# Patient Record
Sex: FEMALE | Race: WHITE
[De-identification: names, ages, dates, MRNs, and addresses within clinical notes are randomized per-mention and may not be internally consistent; named-entity substitution may affect disease eponyms.]

---
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
## 2019-09-18 ENCOUNTER — HOSPITAL ENCOUNTER (EMERGENCY)
Dept: HOSPITAL 84 - D.ER | Age: 19
Discharge: HOME | End: 2019-09-18
Payer: MEDICAID

## 2019-09-18 VITALS — DIASTOLIC BLOOD PRESSURE: 54 MMHG | SYSTOLIC BLOOD PRESSURE: 105 MMHG

## 2019-09-18 VITALS — BODY MASS INDEX: 24.64 KG/M2 | HEIGHT: 66 IN | WEIGHT: 153.32 LBS

## 2019-09-18 DIAGNOSIS — J22: Primary | ICD-10-CM

## 2019-09-18 LAB
ANION GAP SERPL CALC-SCNC: 15.8 MMOL/L (ref 8–16)
BUN SERPL-MCNC: 8 MG/DL (ref 7–18)
CALCIUM SERPL-MCNC: 8.7 MG/DL (ref 8.5–10.1)
CHLORIDE SERPL-SCNC: 104 MMOL/L (ref 98–107)
CO2 SERPL-SCNC: 24.6 MMOL/L (ref 21–32)
CREAT SERPL-MCNC: 0.9 MG/DL (ref 0.6–1.3)
ERYTHROCYTE [DISTWIDTH] IN BLOOD BY AUTOMATED COUNT: 11.8 % (ref 11.5–14.5)
GLUCOSE SERPL-MCNC: 105 MG/DL (ref 74–106)
HCT VFR BLD CALC: 35.4 % (ref 36–48)
HGB BLD-MCNC: 12.3 G/DL (ref 12–16)
LYMPHOCYTES NFR BLD AUTO: 7.1 % (ref 15–50)
MCH RBC QN AUTO: 30.1 PG (ref 26–34)
MCHC RBC AUTO-ENTMCNC: 34.7 G/DL (ref 31–37)
MCV RBC: 86.6 FL (ref 80–100)
NEUTROPHILS NFR BLD AUTO: 88.7 % (ref 40–80)
OSMOLALITY SERPL CALC.SUM OF ELEC: 278 MOSM/KG (ref 275–300)
PLATELET # BLD: 315 10X3/UL (ref 130–400)
PMV BLD AUTO: 9 FL (ref 7.4–10.4)
POTASSIUM SERPL-SCNC: 3.4 MMOL/L (ref 3.5–5.1)
RBC # BLD AUTO: 4.09 10X6/UL (ref 4–5.4)
SODIUM SERPL-SCNC: 141 MMOL/L (ref 136–145)
WBC # BLD AUTO: 16.3 10X3/UL (ref 4.8–10.8)

## 2019-09-22 ENCOUNTER — HOSPITAL ENCOUNTER (INPATIENT)
Dept: HOSPITAL 84 - D.ER | Age: 19
LOS: 9 days | Discharge: HOME | DRG: 177 | End: 2019-10-01
Attending: FAMILY MEDICINE | Admitting: FAMILY MEDICINE
Payer: MEDICAID

## 2019-09-22 VITALS
BODY MASS INDEX: 24.32 KG/M2 | BODY MASS INDEX: 24.32 KG/M2 | BODY MASS INDEX: 24.32 KG/M2 | HEIGHT: 66 IN | HEIGHT: 66 IN | WEIGHT: 151.32 LBS | WEIGHT: 151.32 LBS | BODY MASS INDEX: 24.32 KG/M2

## 2019-09-22 VITALS — DIASTOLIC BLOOD PRESSURE: 76 MMHG | SYSTOLIC BLOOD PRESSURE: 125 MMHG

## 2019-09-22 VITALS — DIASTOLIC BLOOD PRESSURE: 87 MMHG | SYSTOLIC BLOOD PRESSURE: 138 MMHG

## 2019-09-22 VITALS — SYSTOLIC BLOOD PRESSURE: 125 MMHG | DIASTOLIC BLOOD PRESSURE: 76 MMHG

## 2019-09-22 VITALS — DIASTOLIC BLOOD PRESSURE: 79 MMHG | SYSTOLIC BLOOD PRESSURE: 129 MMHG

## 2019-09-22 DIAGNOSIS — J15.212: ICD-10-CM

## 2019-09-22 DIAGNOSIS — J15.6: Primary | ICD-10-CM

## 2019-09-22 DIAGNOSIS — J96.01: ICD-10-CM

## 2019-09-22 DIAGNOSIS — F12.90: ICD-10-CM

## 2019-09-22 DIAGNOSIS — I48.0: ICD-10-CM

## 2019-09-22 LAB
ALBUMIN SERPL-MCNC: 2.7 G/DL (ref 3.4–5)
ALP SERPL-CCNC: 112 U/L (ref 46–116)
ALT SERPL-CCNC: 32 U/L (ref 10–68)
AMPHETAMINES UR QL SCN: NEGATIVE QUAL
ANION GAP SERPL CALC-SCNC: 13 MMOL/L (ref 8–16)
APPEARANCE UR: CLEAR
BARBITURATES UR QL SCN: NEGATIVE QUAL
BASOPHILS NFR BLD AUTO: 0.1 % (ref 0–2)
BENZODIAZ UR QL SCN: NEGATIVE QUAL
BILIRUB SERPL-MCNC: 0.43 MG/DL (ref 0.2–1.3)
BILIRUB SERPL-MCNC: NEGATIVE MG/DL
BUN SERPL-MCNC: 7 MG/DL (ref 7–18)
BZE UR QL SCN: NEGATIVE QUAL
CALCIUM SERPL-MCNC: 8.1 MG/DL (ref 8.5–10.1)
CANNABINOIDS UR QL SCN: POSITIVE QUAL
CHLORIDE SERPL-SCNC: 105 MMOL/L (ref 98–107)
CK MB SERPL-MCNC: 0.2 U/L (ref 0–3.6)
CK SERPL-CCNC: 23 UL (ref 21–215)
CO2 SERPL-SCNC: 25.7 MMOL/L (ref 21–32)
COLOR UR: YELLOW
CREAT SERPL-MCNC: 0.7 MG/DL (ref 0.6–1.3)
EOSINOPHIL NFR BLD: 1.9 % (ref 0–7)
ERYTHROCYTE [DISTWIDTH] IN BLOOD BY AUTOMATED COUNT: 12.3 % (ref 11.5–14.5)
ERYTHROCYTE [SEDIMENTATION RATE] IN BLOOD: 108 MM/HR (ref 0–20)
GLOBULIN SER-MCNC: 3.7 G/L
GLUCOSE SERPL-MCNC: 117 MG/DL (ref 74–106)
GLUCOSE SERPL-MCNC: NEGATIVE MG/DL
HCG UR QL: NEGATIVE
HCT VFR BLD CALC: 38 % (ref 36–48)
HGB BLD-MCNC: 13.3 G/DL (ref 12–16)
IMM GRANULOCYTES NFR BLD: 0.2 % (ref 0–5)
KETONES UR STRIP-MCNC: NEGATIVE MG/DL
LYMPHOCYTES NFR BLD AUTO: 6.8 % (ref 15–50)
MAGNESIUM SERPL-MCNC: 1.8 MG/DL (ref 1.8–2.4)
MCH RBC QN AUTO: 30.4 PG (ref 26–34)
MCHC RBC AUTO-ENTMCNC: 35 G/DL (ref 31–37)
MCV RBC: 86.8 FL (ref 80–100)
MONOCYTES NFR BLD: 1.5 % (ref 2–11)
NEUTROPHILS NFR BLD AUTO: 89.5 % (ref 40–80)
NITRITE UR-MCNC: NEGATIVE MG/ML
OPIATES UR QL SCN: NEGATIVE QUAL
OSMOLALITY SERPL CALC.SUM OF ELEC: 277 MOSM/KG (ref 275–300)
PCP UR QL SCN: NEGATIVE QUAL
PH UR STRIP: 6 [PH] (ref 5–6)
PLATELET # BLD: 360 10X3/UL (ref 130–400)
PMV BLD AUTO: 9.2 FL (ref 7.4–10.4)
POTASSIUM SERPL-SCNC: 3.7 MMOL/L (ref 3.5–5.1)
PROT SERPL-MCNC: 6.4 G/DL (ref 6.4–8.2)
PROT UR-MCNC: NEGATIVE MG/DL
RBC # BLD AUTO: 4.38 10X6/UL (ref 4–5.4)
SODIUM SERPL-SCNC: 140 MMOL/L (ref 136–145)
SP GR UR STRIP: 1.01 (ref 1–1.02)
TROPONIN I SERPL-MCNC: 0 NG/ML (ref 0–0.06)
UROBILINOGEN UR-MCNC: NORMAL MG/DL
WBC # BLD AUTO: 15.5 10X3/UL (ref 4.8–10.8)

## 2019-09-22 NOTE — NUR
ADMISSION ASSESSMENT COMPLETED. PT RESTING IN BED WITH MOTHER AT BEDSIDE.
ALERT/ORIENTED.  IVF INFUSING. NO DISTRESS.

## 2019-09-22 NOTE — NUR
PT AWAKE AND ORIENTED, UP AD ALIRIO, ARRIVED VIA WHEELCHAIR. FRIENDLY, NO
COMPLAINTS/CONCERNS, CL IN REACH, SRX2.

## 2019-09-22 NOTE — NUR
RECEIVED REPORT, WILL ASSUME CARE OF PT, DENIES ANY NEEDS AT THIS TIME, FAMILY
AT BEDSIDE, BED IS LOW, SRX2, CALL LIGHT IN REACH, WILL CONTINUE PLAN OF CARE

## 2019-09-23 VITALS — DIASTOLIC BLOOD PRESSURE: 68 MMHG | SYSTOLIC BLOOD PRESSURE: 112 MMHG

## 2019-09-23 VITALS — DIASTOLIC BLOOD PRESSURE: 78 MMHG | SYSTOLIC BLOOD PRESSURE: 132 MMHG

## 2019-09-23 VITALS — DIASTOLIC BLOOD PRESSURE: 61 MMHG | SYSTOLIC BLOOD PRESSURE: 109 MMHG

## 2019-09-23 VITALS — SYSTOLIC BLOOD PRESSURE: 128 MMHG | DIASTOLIC BLOOD PRESSURE: 46 MMHG

## 2019-09-23 VITALS — DIASTOLIC BLOOD PRESSURE: 71 MMHG | SYSTOLIC BLOOD PRESSURE: 120 MMHG

## 2019-09-23 VITALS — SYSTOLIC BLOOD PRESSURE: 118 MMHG | DIASTOLIC BLOOD PRESSURE: 72 MMHG

## 2019-09-23 VITALS — SYSTOLIC BLOOD PRESSURE: 113 MMHG | DIASTOLIC BLOOD PRESSURE: 57 MMHG

## 2019-09-23 NOTE — NUR
PT CARE ASSUMED. BEDSIDE SHIFT REPORT COMPLETE. PT IN BED RR EVEN AND
UNLABORED. NO S/S OF DISTRESS NOTED AT THIS TIME. NO NEEDS EXPRESSED. CALL
LIGHT IN REACH. WILL CTM.

## 2019-09-23 NOTE — NUR
PT IS ALERT AND ORIENTED X4, LYING IN BED, BOYFRIEND ASLEEP IN CHAIR BESIDE
BED. NO FAMILY PRESENT AT THIS TIME, SPOKE WITH PT ABOUT HER POSITIVE [facility] DRUG
SCREEN AND VAPING. PT STATES IT WAS ABOUT 2-3 WEEKS AGO WHEN SHE VAPED WITH
[facility], BUT THAT SHE DID NOT ENJOY IT AND HAS NOT DONE IT AGAIN. NO COMPLAINTS,
CONCERNS OR QUESTIONS AT THIS TIME, CL IN REACH, SRX2.

## 2019-09-24 VITALS — SYSTOLIC BLOOD PRESSURE: 110 MMHG | DIASTOLIC BLOOD PRESSURE: 65 MMHG

## 2019-09-24 VITALS — DIASTOLIC BLOOD PRESSURE: 66 MMHG | SYSTOLIC BLOOD PRESSURE: 108 MMHG

## 2019-09-24 VITALS — DIASTOLIC BLOOD PRESSURE: 64 MMHG | SYSTOLIC BLOOD PRESSURE: 103 MMHG

## 2019-09-24 VITALS — DIASTOLIC BLOOD PRESSURE: 78 MMHG | SYSTOLIC BLOOD PRESSURE: 130 MMHG

## 2019-09-24 VITALS — SYSTOLIC BLOOD PRESSURE: 126 MMHG | DIASTOLIC BLOOD PRESSURE: 65 MMHG

## 2019-09-24 VITALS — DIASTOLIC BLOOD PRESSURE: 84 MMHG | SYSTOLIC BLOOD PRESSURE: 136 MMHG

## 2019-09-24 VITALS — SYSTOLIC BLOOD PRESSURE: 121 MMHG | DIASTOLIC BLOOD PRESSURE: 74 MMHG

## 2019-09-24 VITALS — SYSTOLIC BLOOD PRESSURE: 132 MMHG | DIASTOLIC BLOOD PRESSURE: 76 MMHG

## 2019-09-24 LAB
ANION GAP SERPL CALC-SCNC: 14.3 MMOL/L (ref 8–16)
BASOPHILS NFR BLD AUTO: 0.1 % (ref 0–2)
BUN SERPL-MCNC: 5 MG/DL (ref 7–18)
CALCIUM SERPL-MCNC: 9 MG/DL (ref 8.5–10.1)
CHLORIDE SERPL-SCNC: 104 MMOL/L (ref 98–107)
CO2 SERPL-SCNC: 23 MMOL/L (ref 21–32)
CREAT SERPL-MCNC: 0.6 MG/DL (ref 0.6–1.3)
EOSINOPHIL NFR BLD: 1.2 % (ref 0–7)
ERYTHROCYTE [DISTWIDTH] IN BLOOD BY AUTOMATED COUNT: 12.6 % (ref 11.5–14.5)
GLUCOSE SERPL-MCNC: 92 MG/DL (ref 74–106)
HCT VFR BLD CALC: 31.4 % (ref 36–48)
HGB BLD-MCNC: 10.9 G/DL (ref 12–16)
IGA SERPL-MCNC: 123 MG/DL (ref 87–352)
IGG SERPL-MCNC: 781 MG/DL (ref 700–1600)
IMM GRANULOCYTES NFR BLD: 0.3 % (ref 0–5)
LYMPHOCYTES NFR BLD AUTO: 7.8 % (ref 15–50)
M PNEUMO IGG SER IA-ACNC: 407 U/ML (ref 0–99)
MCH RBC QN AUTO: 29.6 PG (ref 26–34)
MCHC RBC AUTO-ENTMCNC: 34.7 G/DL (ref 31–37)
MCV RBC: 85.3 FL (ref 80–100)
MONOCYTES NFR BLD: 0.8 % (ref 2–11)
NEUTROPHILS NFR BLD AUTO: 89.8 % (ref 40–80)
OSMOLALITY SERPL CALC.SUM OF ELEC: 272 MOSM/KG (ref 275–300)
PLATELET # BLD: 348 10X3/UL (ref 130–400)
PMV BLD AUTO: 9 FL (ref 7.4–10.4)
POTASSIUM SERPL-SCNC: 3.3 MMOL/L (ref 3.5–5.1)
RBC # BLD AUTO: 3.68 10X6/UL (ref 4–5.4)
SODIUM SERPL-SCNC: 138 MMOL/L (ref 136–145)
WBC # BLD AUTO: 10.6 10X3/UL (ref 4.8–10.8)

## 2019-09-24 NOTE — NUR
RESTING IN BED, NO DISTRESS. IV FLUIDS INFUSING AS ORDERED. PATIENTS FAMILY AT
BEDSIDE. NO DISTRESS.

## 2019-09-24 NOTE — NUR
PATIENT RETURNED TO UNIT VIA BED FROM BRONCHOSCOPY. FAMILY AT BEDSIDE. PATIENT
ALERT/ORIENTED. CALL LIGHT WITHIN REACH. VS MONITORED EVERY 15 MINUTES
STARTING NOW AFTER RETURING FROM PROCEDURE. PATIENT IS ABLE TO RESUME ORAL
COMSUMPTION AT 1630 PER PACU NURSE.

## 2019-09-24 NOTE — NUR
PATIENT [id number] OF NOT BEING ABLE TO BREATHE WELL. JUST COMPLETED NEBULIZER
TREATMENT. PATIENT LYING FLAT AT THIS TIME. INSTRUCTED PATIENT TO SIT STRAIGHT
UP, LUNGS CLEAR TO ALL LOBES WITH THE EXCEPTION TO THE LEFT POSTERIOR LOBE IS
DIMENISHED, NOT MOVING MUCH AIR. PATIENT STATES SHE FEELS BETTER NOW THAT SHE
IS SITTING UP. CALLED RESP THERAPY TO BEDSIDE TO EVALUATE AS WELL. FAMILY AT
BEDSIDE. NO ACUTE DISTRESS AT THIS TIME. IV FLUIDS INFUSING AS ORDERED.

## 2019-09-24 NOTE — EC
PATIENT:BONITA LUCERO                DATE OF SERVICE: 09/22/19
SEX: F                                  MEDICAL RECORD: [id number]
DATE OF BIRTH: [birth year]                        LOCATION:D.M2      D.211
AGE OF PATIENT: 19                             ADMISSION DATE: 09/22/19
 
REFERRING PHYSICIAN:                               
 
INTERPRETING PHYSICIAN: EAN DUNBAR MD             
 
 
 
                             ECHOCARDIOGRAM REPORT
  ECHO CHARGES 4               ECHO COMPLETE                 Date: 09/23/19
 
 
 
CLINICAL DIAGNOSIS: TACHYCARDIA                   
 
                         ECHOCARDIOGRAPHIC MEASUREMENTS
      (adult normal given)
   AC root (d.<3.7cm) 2.5  cm   LV Septum d (<1.2 cm> 0.8  cm
      Valve Excursion 2.0  cm     LV Septum (systole) 1.0  cm
Left Atria (s.<4.0cm> 2.8  cm          LVPW d(<1.2cm) 1.0  cm
        RV (d.<2.3cm) 2.3  cm           LVPW (sytole) 1.4  cm
  LV diastole(<5.6CM) 4.6  cm       MV E-F(>70mm/sec)      cm
           LV systole 2.9  cm           LVOT Diameter 2.0  cm
       MV exc.(>10mm)      cm
Est.ejection fraction (50-75%)     %
 
   DOPPLER:
     LVIT      cm/sec A 60   cm/sec E 94    cm/sec
       LA      cm/sec      RVSP 23.0 mmHg
     LVOT 129  cm/sec   AOP1/2T      m/s
  Asc. Ao 147  cm/sec
     RVOT 64   cm/sec
       RA      cm/sec
       PA 92   cm/sec
 AV Gradient Peak 8.6  mmHg  AV Mean 5.1  mmHg  AV Area 2.7  cm
 MV Gradient Peak 6.8  mmHg  MV Mean 3.2  mmHg  MV Area      cm
   COMMENTS:                                              
 
 
 Cardiac Sonographer: Jaclyn BAILEYICA DOWNS             
      Cardiologist: 1          Dr. Dunbar                
             TAPE# PACS           
                                       Pericardial Effusion N                        
 
 
DATE OF SERVICE:  09/23/2019
 
FINDINGS:
1.  Left ventricular chamber size is within normal limits.  Left ventricular
systolic function is normal at 65%.
2.  Left atrium is within normal limits.  Right atrium and right ventricular
chamber sizes are mildly dilated.
3.  Valvular structures have normal structure and motion.
4.  Doppler interrogation reveals no significant valvular insufficiency or
stenosis and pulmonary systolic pressure is estimated at 23 mmHg.
 
 
 
ECHOCARDIOGRAM REPORT                          [id number]    BONITA LUCERO        
 
 
5.  No evidence of pericardial effusion or left ventricular thrombus.
 
TRANSINT:[id number] Voice Confirmation ID: [id number] DOCUMENT ID: [id number]
                                           
                                           EAN DUNBAR MD             
 
 
 
Electronically Signed by EAN DUNBAR on 09/24/19 at 1338
 
 
 
 
 
 
 
 
 
 
 
 
 
 
 
 
 
 
 
 
 
 
 
 
 
 
 
 
 
 
 
 
 
 
 
 
 
CC:                                                             [id number]
DICTATION DATE: 09/23/19 1649     :     09/24/19 0127      ADM IN  
                                                                              
[id number] [place] [id number]

## 2019-09-24 NOTE — CN
PATIENT NAME:BONITA LUCERO                              MEDICAL RECORD: [id number]
: [birth year]                                              LOCATION:[facility] D.2110
ADMIT DATE: 19                                       ACCOUNT: [id number]
CONSULTING PHYSICIAN:    EAN SADLER MD             
                                               
REFERRING PHYSICIAN:     DANNIE SEALS MD           
 
 
DATE OF CONSULTATION:  2019
 
CARDIOLOGY CONSULTATION
 
DIAGNOSES:
1.  Tachycardia.
2.  Valvular heart disease, mitral regurgitation.
3.  Shortness breath, dyspnea on exertion.
4.  Pneumonia.
5.  Asthma.
6.  Paroxysmal atrial fibrillation.
 
HISTORY OF PRESENT ILLNESS:  This is a patient who has been seen by Dr. Velazquez in
the past with paroxysmal atrial fibrillation, mitral valve prolapse, and mitral
regurgitation now presents with shortness of breath, dyspnea on exertion,
myalgias, fevers and is diagnosed with pneumonia.  She has been treated for
pneumonia.  She is also with a history of asthma, on bronchodilators for the
asthma.  She has had sinus tachycardia up to the 150 range, but no atrial
fibrillation.  There was no atrial flutter.
 
PHYSICAL EXAMINATION:
CONSTITUTIONAL/GENERAL APPEARANCE:  Well nourished, well developed, appears
stated age.
EYES:  Lids and conjunctivae noninjected.  No discharge.  No pallor.
ENT:  Lips within normal limit.  No cyanosis.  No pallor.
NECK:  Carotid arteries, bilateral normal upstroke.  No bruits.  No thrills.  No
jugular venous pressure or distention.
CERVICAL LYMPH NODES:  Nontender.  Nonenlarged.
THYROID:  Not enlarged.  No nodules.
CARDIOVASCULAR:  Precordial exam, nondisplaced.  No heaves or pericardial
thrills.  Rate and rhythm, regular.  Heart sounds, normal S1, normal S2.  No S3,
no gallop, no rub.  Systolic murmur, not heard.  Diastolic murmur, not heard.
RESPIRATORY:  Respiratory effort, unlabored.  Normal curvature.  No thoracic
deformity.  No chest wall tenderness.  Percussion, resonant.  Auscultation,
clear.  No wheezes, no rales, no rhonchi.
ABDOMEN:  Soft, nondistended, nontender.  No abdominal pain, no vomiting and
normal appetite.
MUSCULOSKELETAL:  No joint tenderness, normal gait, normal tone.
SKIN:  Warm and dry.
 
OVERALL IMPRESSION:  Tachycardia, this is a physiologic response at this time to
the pneumonia, bronchodilators, and the shortness of breath, dyspnea on
exertion.  She has had no complex dysrhythmias.  At this time, we will recheck
an echo to reassess her valvular heart disease.  At this time, no other workup
or treatment will be necessary.
 
TRANSINT:[id number] Voice Confirmation ID: [id number] DOCUMENT ID: [id number]
 
 
 
CONSULT REPORT                                 [id number]    BONITA LUCERO JEFFREY MD             
 
 
 
Electronically Signed by EAN SADLER on 19 at 1338
 
 
 
 
 
 
 
 
 
 
 
 
 
 
 
 
 
 
 
 
 
 
 
 
 
 
 
 
 
 
 
 
 
 
 
 
 
 
 
 
 
CC:                                                             [id number]
DICTATION DATE: 19 1203     :     19 1355      ADM IN  
                                                                              
[id number] [place] [address]

## 2019-09-24 NOTE — NUR
RECEIVED REPORT. ASSUMED CARE OF PATIENT. RESTING ON LEFT LATERAL SIDE WITH
EYES CLOSED, EASILY AROUSED. RESP EVEN AND UNLABORED. NO DISTRESS.

## 2019-09-25 VITALS — DIASTOLIC BLOOD PRESSURE: 83 MMHG | SYSTOLIC BLOOD PRESSURE: 128 MMHG

## 2019-09-25 VITALS — SYSTOLIC BLOOD PRESSURE: 116 MMHG | DIASTOLIC BLOOD PRESSURE: 68 MMHG

## 2019-09-25 VITALS — DIASTOLIC BLOOD PRESSURE: 80 MMHG | SYSTOLIC BLOOD PRESSURE: 125 MMHG

## 2019-09-25 VITALS — DIASTOLIC BLOOD PRESSURE: 60 MMHG | SYSTOLIC BLOOD PRESSURE: 123 MMHG

## 2019-09-25 VITALS — SYSTOLIC BLOOD PRESSURE: 117 MMHG | DIASTOLIC BLOOD PRESSURE: 68 MMHG

## 2019-09-25 VITALS — SYSTOLIC BLOOD PRESSURE: 130 MMHG | DIASTOLIC BLOOD PRESSURE: 76 MMHG

## 2019-09-25 LAB
ANION GAP SERPL CALC-SCNC: 13.2 MMOL/L (ref 8–16)
BASOPHILS NFR BLD AUTO: 0.1 % (ref 0–2)
BUN SERPL-MCNC: 7 MG/DL (ref 7–18)
CALCIUM SERPL-MCNC: 8.8 MG/DL (ref 8.5–10.1)
CHLORIDE SERPL-SCNC: 106 MMOL/L (ref 98–107)
CO2 SERPL-SCNC: 24.6 MMOL/L (ref 21–32)
CREAT SERPL-MCNC: 0.6 MG/DL (ref 0.6–1.3)
EOSINOPHIL NFR BLD: 0 % (ref 0–7)
EOSINOPHIL NFR FLD MANUAL: 9 %
ERYTHROCYTE [DISTWIDTH] IN BLOOD BY AUTOMATED COUNT: 12.7 % (ref 11.5–14.5)
GLUCOSE SERPL-MCNC: 135 MG/DL (ref 74–106)
HCT VFR BLD CALC: 30 % (ref 36–48)
HGB BLD-MCNC: 10.5 G/DL (ref 12–16)
IMM GRANULOCYTES NFR BLD: 0.3 % (ref 0–5)
LYMPHOCYTES NFR BLD AUTO: 3.3 % (ref 15–50)
MACROPHAGES NFR FLD MANUAL: 20 %
MCH RBC QN AUTO: 29.7 PG (ref 26–34)
MCHC RBC AUTO-ENTMCNC: 35 G/DL (ref 31–37)
MCV RBC: 85 FL (ref 80–100)
MONOCYTES NFR BLD: 0.5 % (ref 2–11)
NEUTROPHILS NFR BLD AUTO: 95.8 % (ref 40–80)
NEUTROPHILS NFR FLD MANUAL: 64 %
OSMOLALITY SERPL CALC.SUM OF ELEC: 278 MOSM/KG (ref 275–300)
PLATELET # BLD: 310 10X3/UL (ref 130–400)
PMV BLD AUTO: 8.9 FL (ref 7.4–10.4)
POTASSIUM SERPL-SCNC: 3.8 MMOL/L (ref 3.5–5.1)
RBC # BLD AUTO: 3.53 10X6/UL (ref 4–5.4)
SODIUM SERPL-SCNC: 140 MMOL/L (ref 136–145)
WBC # BLD AUTO: 11.6 10X3/UL (ref 4.8–10.8)

## 2019-09-25 PROCEDURE — 0B9J8ZX DRAINAGE OF LEFT LOWER LUNG LOBE, VIA NATURAL OR ARTIFICIAL OPENING ENDOSCOPIC, DIAGNOSTIC: ICD-10-PCS | Performed by: INTERNAL MEDICINE

## 2019-09-25 NOTE — NUR
PATIENT IS STABLE. PATIENT DENIES ANY NEEDS OR PAIN. WILL CONTINUE TO MONITOR.
SR UP X 2 BED IN LOW POSITION AND CALL LIGHT  IN  REACH.

## 2019-09-25 NOTE — NUR
PT CARE ASSUMED. BEDSIDE SHIFT REPORT COMPLETE. PT IN BED RR EVEN AND
UNLABORED. NO S/S OF DISTRESS NOTED AT THIS TIME. PT DENIES NEEDS. MULTIPLE
FRIENDS AT BEDSIDE. CALL LIGHT IN REACH. WILL CPOC.

## 2019-09-25 NOTE — MORECARE
CASE MANAGEMENT DISCHARGE SUMMARY
 
 
PATIENT: BONITA LUCERO                    UNIT: [id number]
ACCOUNT#: [id number]                       ADM DATE: 19
AGE: 19     : [birth year]  SEX: F            ROOM/BED: D[facility]    
AUTHOR: ENEDINADOC                             PHYSICIAN:                               
 
REFERRING PHYSICIAN: DANNIE SEALS MD           
DATE OF SERVICE: 19
Discharge Plan
 
 
Patient Name: BONITA LUCERO
Facility: [facility]:[place]
Encounter #: [id number]
Medical Record #: [id number]
: 2000
Planned Disposition: 
Anticipated Discharge Date: 
 
Discharge Date: 
Expected LOS: 
Initial Reviewer: [id number]
Initial Review Date: 2019
Generated: 19   4:40 pm 
Comments
 
DCP- Discharge Planning
 
Updated by [id number]: Yi Villarreal on 19   2:39 pm CT
Patient Name: BONITA LUCERO                                     
Admission Status: ER   
Accout number: [id number]                              
Admission Date: 2019   
: 2000                                                        
Admission Diagnosis:SHORTNESS OF BREATH   
Attending: ANEL SEALS                                                
Current LOS:  3   
  
Anticipated DC Date:    
Planned Disposition:    
Primary Insurance: [employer] [place] PRIVATE OPTIONS IRAM   
  
  
Discharge Planning Comments: CM MET WITH PATIENT ABOUT DC PLANNING/NEEDS. 
STATES MAY NEED O2 AND NEBS AT DISCHARGE. [facility] SIGNED FOR MODERN CARE IN 
[facility]. CM WILL FOLLOW AND ASSIST.   
  
  
 
  
  
  
  
: iY Villarreal
 DCPIA - Discharge Planning Initial Assessment
 
Updated by [id number]: Yi Villarreal on 19   3:38 pm
*  Is the patient Alert and Oriented?
Yes
*  PCP
ABA
*  Pharmacy
[facility] IN [place]
*  Preadmission Environment
Home with Family
*  ADLs
Independent
*  Other Equipment
NONE
*  Community resources currently utilized
None
*  Can the patient safely return to the preadmission environment?
Yes
*  Has this patient been hospitalized within the prior 30 days at any 
hospital?
No
 
 
 
 
 
Coverage Notice
 
Reviewer: [email or web address] - Yi Villarreal
 
Notice Issued Date-Time: 2019  15:39
Notice Type: Patient Choice Letter
 
Notice Delivered To: Patient
Relationship to Patient: 
Representative Name: 
 
Delivery Method: HAND - Hand Delivered
Isabell Days:
Prior Verbal Notification: 
 
Recipient Understood Notice: Yes
Recipient Signature: Yes
Med Rec Note Co-signed by Attending:
 
Coverage Notice Comment:  
 
 
Last DP export: 19  10:18 a
Patient Name: BONITA LUCERO
Encounter #: [id number]
Page [id number]
 
 
 
 
 
Electronically Signed by HARPAL MCCONNELL on 19 at 1540
 
 
 
 
 
 
**All edits/amendments must be made on the electronic document**
 
DICTATION DATE: 19 154     : MEGAN  19     
RPT#: [id number]                                DC DATE:        
                                               STATUS: ADM IN  
[facility]
 [place] [address]
***END OF REPORT***

## 2019-09-25 NOTE — NUR
PATIENT STABLE AND VSS. PATIENT ORDERED LUNCH TRAY. MOTHER  AT BS. PATIENT
DENIES ANY NEEDS OR PAIN. WILL CONTINUE TO [facility]. SR UP X 2 BED IN LOW
POSITION AND CALL LIGHT IN REACH.

## 2019-09-25 NOTE — NUR
PATIENT RETURNED FROM SURGERY. PATIENT IS STABLE AND VSS. PATIENT IS AWAKE.
DENIES ANY NEEDS OR PAIN. FREQUENT VS SET UP. MOTHER AT BS. PATIENT TO REMAIN
NPO FOR TWO HOURS. SR UP X 2 BED IN LOW POSITION AND CALL LIGHT INR EACH.

## 2019-09-25 NOTE — NUR
PATEINT STABLE AND UNCHANGED. PATIENT DENIES ANY NEEDS OR PAIN. WILL CONTINUE
TO MONITOR.  MOTHER AT BS. SR UP X 2 BED IN LOW POSITION AND CALL LIGHT IN
REACH.

## 2019-09-25 NOTE — NUR
REPORT RECEIVED FROM NIGHT SHIFT AND PATIENT CARE ASSUMED. PATIENT SITTING UP
IN BS CHAIR AWAKE, ALERT AND ORIENTED X 4. PATIENT DENIES ANY NEEDS OR PAIN.
WILL CONTINUE WITH PLAN OF CARE. SR UP X 2 BED IN LOW POSTION AND CALL LIGHT
IN REACH. FAMILY AT BS.

## 2019-09-25 NOTE — MORECARE
CASE MANAGEMENT DISCHARGE SUMMARY
 
 
PATIENT: BONITA LUCERO                    UNIT: [id number]
ACCOUNT#: [id number]                       ADM DATE: 19
AGE: 19     : [birth year]  SEX: F            ROOM/BED: D.2110    
AUTHOR: HARPAL MCCONNELL                             PHYSICIAN:                               
 
REFERRING PHYSICIAN: DANNIE SEALS MD           
DATE OF SERVICE: 19
Discharge Plan
 
 
Patient Name: BONITA LUCERO
[facility]
Encounter #: [id number]
Medical Record #: [id number]
: 2000
Planned Disposition: 
Anticipated Discharge Date: 
 
Discharge Date: 
Expected LOS: 
Initial Reviewer: [email or web address]
Initial Review Date: 2019
Generated: 19  12:18 pm 
  
 
 
 
 
 
 
 
Patient Name: BONITA LUCERO
 
Encounter #: [id number]
Page [id number]
 
 
 
 
 
Electronically Signed by HARPAL MCCONNELL on 19 at 1118
 
 
 
 
 
 
**All edits/amendments must be made on the electronic document**
 
DICTATION DATE: 19 1118     : MEGAN  19 1118     
RPT#: [id number]                                DC DATE:        
                                               STATUS: ADM IN  
[facility]
[address] [place] [address]
***END OF REPORT***

## 2019-09-26 VITALS — SYSTOLIC BLOOD PRESSURE: 125 MMHG | DIASTOLIC BLOOD PRESSURE: 67 MMHG

## 2019-09-26 VITALS — SYSTOLIC BLOOD PRESSURE: 135 MMHG | DIASTOLIC BLOOD PRESSURE: 80 MMHG

## 2019-09-26 VITALS — DIASTOLIC BLOOD PRESSURE: 87 MMHG | SYSTOLIC BLOOD PRESSURE: 132 MMHG

## 2019-09-26 VITALS — DIASTOLIC BLOOD PRESSURE: 82 MMHG | SYSTOLIC BLOOD PRESSURE: 124 MMHG

## 2019-09-26 VITALS — SYSTOLIC BLOOD PRESSURE: 118 MMHG | DIASTOLIC BLOOD PRESSURE: 64 MMHG

## 2019-09-26 VITALS — SYSTOLIC BLOOD PRESSURE: 130 MMHG | DIASTOLIC BLOOD PRESSURE: 82 MMHG

## 2019-09-26 LAB
ANION GAP SERPL CALC-SCNC: 12.7 MMOL/L (ref 8–16)
BASOPHILS NFR BLD AUTO: 0.1 % (ref 0–2)
BUN SERPL-MCNC: 7 MG/DL (ref 7–18)
CALCIUM SERPL-MCNC: 8.9 MG/DL (ref 8.5–10.1)
CHLORIDE SERPL-SCNC: 107 MMOL/L (ref 98–107)
CO2 SERPL-SCNC: 25.1 MMOL/L (ref 21–32)
CREAT SERPL-MCNC: 0.6 MG/DL (ref 0.6–1.3)
EOSINOPHIL NFR BLD: 0 % (ref 0–7)
ERYTHROCYTE [DISTWIDTH] IN BLOOD BY AUTOMATED COUNT: 12.8 % (ref 11.5–14.5)
GLUCOSE SERPL-MCNC: 181 MG/DL (ref 74–106)
HCT VFR BLD CALC: 29.6 % (ref 36–48)
HGB BLD-MCNC: 10.3 G/DL (ref 12–16)
IGE SERPL-ACNC: 92 IU/ML (ref 6–495)
IMM GRANULOCYTES NFR BLD: 0.3 % (ref 0–5)
LYMPHOCYTES NFR BLD AUTO: 4.1 % (ref 15–50)
MCH RBC QN AUTO: 29.6 PG (ref 26–34)
MCHC RBC AUTO-ENTMCNC: 34.8 G/DL (ref 31–37)
MCV RBC: 85.1 FL (ref 80–100)
MONOCYTES NFR BLD: 1.6 % (ref 2–11)
NEUTROPHILS NFR BLD AUTO: 93.9 % (ref 40–80)
OSMOLALITY SERPL CALC.SUM OF ELEC: 285 MOSM/KG (ref 275–300)
PLATELET # BLD: 365 10X3/UL (ref 130–400)
PMV BLD AUTO: 8.9 FL (ref 7.4–10.4)
POTASSIUM SERPL-SCNC: 2.8 MMOL/L (ref 3.5–5.1)
RBC # BLD AUTO: 3.48 10X6/UL (ref 4–5.4)
SODIUM SERPL-SCNC: 142 MMOL/L (ref 136–145)
WBC # BLD AUTO: 17.6 10X3/UL (ref 4.8–10.8)

## 2019-09-26 NOTE — NUR
PT CARE ASSUMED. BEDSIDE SHIFT REPORT COMPLETE. RR EVEN AND UNLABORED ON 4L
HF. NO S/S OF DISTRESS NOTED AT THIS TIME. PROVIDED TYLENOL FOR C/O OF
HEADACHE. NO FUTHER NEEDS EXPRESSED. FAMILY AT BEDSIDE. CALL LIGHT IN REACH.
WILL CPOC.

## 2019-09-26 NOTE — NUR
REPORT RECEIVED FROM NIGHT SHIFT AND PATIENT CARE ASSUMED. PATIENT LAYING IN
BED ON BACK WITH EYES CLOSED AND BREATHING EVENLY. FRIEND KELLYBRIANAMANNY IN BS CHAIR.
WILL CONTINUE WITH PLAN OF CARE. SR UP X 2 BED IN LOW POSITION AND CALL LIGHT
IN REACH.

## 2019-09-26 NOTE — NUR
PATIENT UP IN HALLWAY. ATTEMPTED TO AMUBULATE. PATIENT BECAME DYSPENIC AND HR
148 WITH 02 AT 5L NC. PATIENT BACK TO ROOM IN BED. FAMILY AT BS. WILL CONTINUE
TO MONITOR. SR UPX 2 BED IN LOW POSITION AND CALL LIGHT IN REACH.

## 2019-09-26 NOTE — NUR
PATIENT SITTING UP IN BED EATING SUPPER. FAMILY AT BS. PATIENT DENIES ANY
NEEDS OR PAIN. WILL CONTINUE TO MONITOR. SR UP X 2 BED IN LOW POSITION AND
CALL LIGHT IN REACH.

## 2019-09-27 VITALS — DIASTOLIC BLOOD PRESSURE: 87 MMHG | SYSTOLIC BLOOD PRESSURE: 139 MMHG

## 2019-09-27 VITALS — SYSTOLIC BLOOD PRESSURE: 121 MMHG | DIASTOLIC BLOOD PRESSURE: 76 MMHG

## 2019-09-27 VITALS — SYSTOLIC BLOOD PRESSURE: 124 MMHG | DIASTOLIC BLOOD PRESSURE: 78 MMHG

## 2019-09-27 VITALS — DIASTOLIC BLOOD PRESSURE: 98 MMHG | SYSTOLIC BLOOD PRESSURE: 121 MMHG

## 2019-09-27 VITALS — SYSTOLIC BLOOD PRESSURE: 146 MMHG | DIASTOLIC BLOOD PRESSURE: 80 MMHG

## 2019-09-27 VITALS — DIASTOLIC BLOOD PRESSURE: 80 MMHG | SYSTOLIC BLOOD PRESSURE: 131 MMHG

## 2019-09-27 LAB
ANION GAP SERPL CALC-SCNC: 11.9 MMOL/L (ref 8–16)
BASOPHILS NFR BLD AUTO: 0 % (ref 0–2)
BUN SERPL-MCNC: 5 MG/DL (ref 7–18)
CALCIUM SERPL-MCNC: 8.8 MG/DL (ref 8.5–10.1)
CHLORIDE SERPL-SCNC: 105 MMOL/L (ref 98–107)
CK SERPL-CCNC: 11 UL (ref 21–215)
CO2 SERPL-SCNC: 28.6 MMOL/L (ref 21–32)
CREAT SERPL-MCNC: 0.5 MG/DL (ref 0.6–1.3)
CRP SERPL-MCNC: 13.7 MG/DL (ref 0–0.9)
EOSINOPHIL NFR BLD: 0 % (ref 0–7)
ERYTHROCYTE [DISTWIDTH] IN BLOOD BY AUTOMATED COUNT: 12.8 % (ref 11.5–14.5)
ERYTHROCYTE [SEDIMENTATION RATE] IN BLOOD: 86 MM/HR (ref 0–20)
GLUCOSE SERPL-MCNC: 164 MG/DL (ref 74–106)
HCT VFR BLD CALC: 30.2 % (ref 36–48)
HGB BLD-MCNC: 10 G/DL (ref 12–16)
IGG SERPL-MCNC: 735 MG/DL (ref 700–1600)
IGG1 SER-MCNC: 313 MG/DL (ref 248–810)
IGG2 SER-MCNC: 309 MG/DL (ref 130–555)
IGG3 SER-MCNC: 40 MG/DL (ref 15–102)
IGG4 SER-MCNC: 15 MG/DL (ref 2–96)
IMM GRANULOCYTES NFR BLD: 0.3 % (ref 0–5)
LYMPHOCYTES NFR BLD AUTO: 3.9 % (ref 15–50)
MCH RBC QN AUTO: 29 PG (ref 26–34)
MCHC RBC AUTO-ENTMCNC: 33.1 G/DL (ref 31–37)
MCV RBC: 87.5 FL (ref 80–100)
MONOCYTES NFR BLD: 1.6 % (ref 2–11)
NEUTROPHILS NFR BLD AUTO: 94.2 % (ref 40–80)
OSMOLALITY SERPL CALC.SUM OF ELEC: 283 MOSM/KG (ref 275–300)
PLATELET # BLD: 433 10X3/UL (ref 130–400)
PMV BLD AUTO: 9 FL (ref 7.4–10.4)
POTASSIUM SERPL-SCNC: 3.5 MMOL/L (ref 3.5–5.1)
RBC # BLD AUTO: 3.45 10X6/UL (ref 4–5.4)
SODIUM SERPL-SCNC: 142 MMOL/L (ref 136–145)
SPECIMEN PREPARATION: (no result)
WBC # BLD AUTO: 14.6 10X3/UL (ref 4.8–10.8)

## 2019-09-27 NOTE — NUR
PT ALERT AND ORIENTED WHEN ENTERING THE ROOM. HOB ELEVATED TO A 40 DEGREE
ANGLE. PATIENT WEARING HF NC AT 4L. HAS RIGHT AC AT A KVO RATE. PATIENT C/O
HEADACHE. REQUESTS TYLENOL WHEN AVAILABLE. DENIES FURTHER NEEDS AT THIS TIME.
DISCUSSED WITH PATIENT SMOKING CESSATION. PATIENT VERBALIZES UNDERSTANDING.
HAS CALL LIGHT IN REACH. CPOC.

## 2019-09-27 NOTE — NUR
DR HILL IN ROOM. NEW ORDERS RECEIVED. PATIENT IS STABLE AND VSS. PATIENT
DENIES ANY NEEDS OR PAIN. WILL CONTINUE TO MONITOR. SR UP X 2 BED IN LOW
POSITION AND CALL LIGHT IN REACH.

## 2019-09-27 NOTE — NUR
REPORT RECEIVED FROM NIGHT SHIFT AND PATIENT CARE ASSUMED. PATIENT LAYING IN
BED AWAKE, ALERT AND ORIENTED X 4. PATIENT IS STABLE AND VSS. PATIENT DENIES
ANY NEEDS OR PAIN. WILL CONTINUE WITH PLAN OF CARE. SR UPX 2 BED IN LOW
POSITION AND CALL LIGHT IN REACH.

## 2019-09-28 VITALS — DIASTOLIC BLOOD PRESSURE: 72 MMHG | SYSTOLIC BLOOD PRESSURE: 132 MMHG

## 2019-09-28 VITALS — DIASTOLIC BLOOD PRESSURE: 93 MMHG | SYSTOLIC BLOOD PRESSURE: 145 MMHG

## 2019-09-28 VITALS — DIASTOLIC BLOOD PRESSURE: 90 MMHG | SYSTOLIC BLOOD PRESSURE: 145 MMHG

## 2019-09-28 VITALS — SYSTOLIC BLOOD PRESSURE: 139 MMHG | DIASTOLIC BLOOD PRESSURE: 90 MMHG

## 2019-09-28 VITALS — DIASTOLIC BLOOD PRESSURE: 69 MMHG | SYSTOLIC BLOOD PRESSURE: 119 MMHG

## 2019-09-28 VITALS — DIASTOLIC BLOOD PRESSURE: 78 MMHG | SYSTOLIC BLOOD PRESSURE: 134 MMHG

## 2019-09-28 LAB
ANION GAP SERPL CALC-SCNC: 12.2 MMOL/L (ref 8–16)
BASOPHILS NFR BLD AUTO: 0 % (ref 0–2)
BUN SERPL-MCNC: 8 MG/DL (ref 7–18)
CALCIUM SERPL-MCNC: 8.5 MG/DL (ref 8.5–10.1)
CHLORIDE SERPL-SCNC: 104 MMOL/L (ref 98–107)
CO2 SERPL-SCNC: 30.6 MMOL/L (ref 21–32)
CREAT SERPL-MCNC: 0.5 MG/DL (ref 0.6–1.3)
EOSINOPHIL NFR BLD: 0 % (ref 0–7)
ERYTHROCYTE [DISTWIDTH] IN BLOOD BY AUTOMATED COUNT: 12.9 % (ref 11.5–14.5)
FERRITIN SERPL-MCNC: 180 NG/ML (ref 3–244)
GLUCOSE SERPL-MCNC: 161 MG/DL (ref 74–106)
HCT VFR BLD CALC: 31.1 % (ref 36–48)
HGB BLD-MCNC: 10.3 G/DL (ref 12–16)
IMM GRANULOCYTES NFR BLD: 0.7 % (ref 0–5)
IRON SERPL-MCNC: 77 UG/DL (ref 35–150)
LYMPHOCYTES NFR BLD AUTO: 5 % (ref 15–50)
MCH RBC QN AUTO: 29.3 PG (ref 26–34)
MCHC RBC AUTO-ENTMCNC: 33.1 G/DL (ref 31–37)
MCV RBC: 88.4 FL (ref 80–100)
MONOCYTES NFR BLD: 2.9 % (ref 2–11)
NEUTROPHILS NFR BLD AUTO: 91.4 % (ref 40–80)
OSMOLALITY SERPL CALC.SUM OF ELEC: 285 MOSM/KG (ref 275–300)
PLATELET # BLD: 414 10X3/UL (ref 130–400)
PMV BLD AUTO: 8.7 FL (ref 7.4–10.4)
POTASSIUM SERPL-SCNC: 3.8 MMOL/L (ref 3.5–5.1)
RBC # BLD AUTO: 3.52 10X6/UL (ref 4–5.4)
SAO2 % BLD FROM PO2: 45 % (ref 15–55)
SODIUM SERPL-SCNC: 143 MMOL/L (ref 136–145)
TIBC SERPL-MCNC: 168 UG/DL (ref 260–445)
UIBC SERPL-MCNC: 91 UG/DL (ref 150–375)
WBC # BLD AUTO: 13.7 10X3/UL (ref 4.8–10.8)

## 2019-09-28 NOTE — NUR
PATIENT REPORTS FEELING PAIN IN HER LEGS AND IS MOVING THEM AROUND ALOT IN THE
BED. CALLED MELODY AND THEN DR BAUMAN TO REPORT THAT THE FAMILY WANTED TO MOVE
THE DAUGHTER THE PATIENT TO [facility]. SHE IS UP TO TAKE A SHOWER AT THIS TIME.

## 2019-09-28 NOTE — NUR
INITIAL ROUNDS COMPLETED AT 1915 HRS.  PT SITTING UP IN THE RECLINER.  NO
DISTRESS NOTED.  FAMILY AT BEDSIDE.  ASSESSMENT COMPLETED AT 1950 HRS.  VSS.
SR PER CM HR 92.  ALERT AND ORIENTED TO PERSON,PLACE AND TIME.  BRYSON.  O2
3LHIGH FLOW O2. LUNGS DIMINISHED IN BASES BILAT.  IV TO RAC WITH NS AT
15CC/HR.  IV PATENT.  PM MEDS GIVEN.  PT CURRENTLY WATCHING TV WITH FAMILY AT
BEDSIDE.  SR UP X2,CALL LIGHT WITHIN REACH.

## 2019-09-29 VITALS — SYSTOLIC BLOOD PRESSURE: 141 MMHG | DIASTOLIC BLOOD PRESSURE: 90 MMHG

## 2019-09-29 VITALS — DIASTOLIC BLOOD PRESSURE: 84 MMHG | SYSTOLIC BLOOD PRESSURE: 138 MMHG

## 2019-09-29 VITALS — DIASTOLIC BLOOD PRESSURE: 90 MMHG | SYSTOLIC BLOOD PRESSURE: 143 MMHG

## 2019-09-29 VITALS — SYSTOLIC BLOOD PRESSURE: 130 MMHG | DIASTOLIC BLOOD PRESSURE: 73 MMHG

## 2019-09-29 VITALS — SYSTOLIC BLOOD PRESSURE: 132 MMHG | DIASTOLIC BLOOD PRESSURE: 88 MMHG

## 2019-09-29 VITALS — SYSTOLIC BLOOD PRESSURE: 136 MMHG | DIASTOLIC BLOOD PRESSURE: 81 MMHG

## 2019-09-29 NOTE — NUR
INITIAL ROUNDS COMPLETED AT 1900 HRS. PT DENIED ANY DISCOMFORT.  IV TO RAC
OCCLUDED AT 1920 HRS.  DC'D WITH CATHETER INTACT.  NEW IV STARTED #22 TO R
AHND WITH ATTEMPT X2. PT TOLERATED ACTIVITY WELL.  ASSESSMENT COMPLETED AT
1930 HRS.  VSS.  O2 2LNC.  SR PER CM HR 74.  ALERT AND ORIENTED TO PERSON,
PLACE AND TIME.  BRYSON. O2 2LHF CANNULA.  LUNGS DMINISHED IN BASES BILAT.
PALPABLE PERIPHERAL PULSES.  PM FSBS 125.  NO COVERAGE NEEDED.  PM MEDS GIVEN.
EXPLAINED RATIONALE FOR NPO AFTER MIDNIGHT FOR AM THORACENTISIS.  PT STATED
UNDERSTANDING.  PT CURERNTLY WATCHING TV, BOYFRIEND AT BEDSIDE.  SR UP X2,
CALL LIGHT WTHIN REACH.

## 2019-09-29 NOTE — NUR
PT AWAKE AND ORIENTED, NO COMPLAINTS/CONCERNS. FAMILY AT BEDSIDE, ALL
QUESTIONS ASNWERED. CL IN REACH, SRX2.

## 2019-09-29 NOTE — NUR
PT AWAKE AND ORIENTED, LYING IN BED. MOTHER AT BEDSIDE. NO COMPLAINTS/CONCERNS
ALL QUESTIONS ANSWRED. CL IN REACH, SRX2.

## 2019-09-30 VITALS — DIASTOLIC BLOOD PRESSURE: 66 MMHG | SYSTOLIC BLOOD PRESSURE: 114 MMHG

## 2019-09-30 VITALS — SYSTOLIC BLOOD PRESSURE: 138 MMHG | DIASTOLIC BLOOD PRESSURE: 86 MMHG

## 2019-09-30 VITALS — SYSTOLIC BLOOD PRESSURE: 115 MMHG | DIASTOLIC BLOOD PRESSURE: 72 MMHG

## 2019-09-30 VITALS — SYSTOLIC BLOOD PRESSURE: 137 MMHG | DIASTOLIC BLOOD PRESSURE: 90 MMHG

## 2019-09-30 VITALS — DIASTOLIC BLOOD PRESSURE: 88 MMHG | SYSTOLIC BLOOD PRESSURE: 136 MMHG

## 2019-09-30 LAB
ANION GAP SERPL CALC-SCNC: 9.3 MMOL/L (ref 8–16)
APTT BLD: 26.1 SECONDS (ref 22.8–39.4)
BASOPHILS NFR BLD AUTO: 0.1 % (ref 0–2)
BUN SERPL-MCNC: 10 MG/DL (ref 7–18)
CALCIUM SERPL-MCNC: 8.9 MG/DL (ref 8.5–10.1)
CHLORIDE SERPL-SCNC: 101 MMOL/L (ref 98–107)
CO2 SERPL-SCNC: 32.6 MMOL/L (ref 21–32)
CREAT SERPL-MCNC: 0.6 MG/DL (ref 0.6–1.3)
EOSINOPHIL NFR BLD: 0 % (ref 0–7)
ERYTHROCYTE [DISTWIDTH] IN BLOOD BY AUTOMATED COUNT: 12.8 % (ref 11.5–14.5)
GLUCOSE SERPL-MCNC: 130 MG/DL (ref 74–106)
HCT VFR BLD CALC: 34.2 % (ref 36–48)
HGB BLD-MCNC: 11.2 G/DL (ref 12–16)
IMM GRANULOCYTES NFR BLD: 2 % (ref 0–5)
INR PPP: 1.1 (ref 0.85–1.17)
LYMPHOCYTES NFR BLD AUTO: 7.2 % (ref 15–50)
MCH RBC QN AUTO: 29.1 PG (ref 26–34)
MCHC RBC AUTO-ENTMCNC: 32.7 G/DL (ref 31–37)
MCV RBC: 88.8 FL (ref 80–100)
MONOCYTES NFR BLD: 3.8 % (ref 2–11)
NEUTROPHILS NFR BLD AUTO: 86.9 % (ref 40–80)
OSMOLALITY SERPL CALC.SUM OF ELEC: 278 MOSM/KG (ref 275–300)
PLATELET # BLD: 517 10X3/UL (ref 130–400)
PMV BLD AUTO: 8.9 FL (ref 7.4–10.4)
POTASSIUM SERPL-SCNC: 3.9 MMOL/L (ref 3.5–5.1)
PROTHROMBIN TIME: 13.7 SECONDS (ref 11.6–15)
RBC # BLD AUTO: 3.85 10X6/UL (ref 4–5.4)
SODIUM SERPL-SCNC: 139 MMOL/L (ref 136–145)
WBC # BLD AUTO: 15.8 10X3/UL (ref 4.8–10.8)

## 2019-09-30 NOTE — NUR
PT A/OX4, SITTING UP IN BED EATING, FAMILY PRESENT, L PIV INTACT AND SL,
TELEMETRY INTACT, NO C/O @ THIS TIME

## 2019-09-30 NOTE — NUR
PT AWAKE AND ORIENTED. NERVOUS ABOUT PROCEDURE TODAY. NO COMPLAINTS OR
CONCERNS, ALL QUESTIONS ANSWERD. CL IN REACH, SRX2.

## 2019-10-01 VITALS — SYSTOLIC BLOOD PRESSURE: 115 MMHG | DIASTOLIC BLOOD PRESSURE: 73 MMHG

## 2019-10-01 VITALS — DIASTOLIC BLOOD PRESSURE: 76 MMHG | SYSTOLIC BLOOD PRESSURE: 119 MMHG

## 2019-10-01 VITALS — DIASTOLIC BLOOD PRESSURE: 88 MMHG | SYSTOLIC BLOOD PRESSURE: 132 MMHG

## 2019-10-01 VITALS — DIASTOLIC BLOOD PRESSURE: 79 MMHG | SYSTOLIC BLOOD PRESSURE: 122 MMHG

## 2019-10-01 VITALS — SYSTOLIC BLOOD PRESSURE: 117 MMHG | DIASTOLIC BLOOD PRESSURE: 75 MMHG

## 2019-10-01 LAB
ANION GAP SERPL CALC-SCNC: 12 MMOL/L (ref 8–16)
BUN SERPL-MCNC: 14 MG/DL (ref 7–18)
CALCIUM SERPL-MCNC: 8.7 MG/DL (ref 8.5–10.1)
CHLORIDE SERPL-SCNC: 101 MMOL/L (ref 98–107)
CO2 SERPL-SCNC: 29.6 MMOL/L (ref 21–32)
CREAT SERPL-MCNC: 0.5 MG/DL (ref 0.6–1.3)
EOSINOPHIL NFR BLD: 1 % (ref 0–7)
ERYTHROCYTE [DISTWIDTH] IN BLOOD BY AUTOMATED COUNT: 13.1 % (ref 11.5–14.5)
GLUCOSE SERPL-MCNC: 98 MG/DL (ref 74–106)
HCT VFR BLD CALC: 35 % (ref 36–48)
HGB BLD-MCNC: 11.5 G/DL (ref 12–16)
LYMPHOCYTES NFR BLD AUTO: 7 % (ref 15–50)
MCH RBC QN AUTO: 29.6 PG (ref 26–34)
MCHC RBC AUTO-ENTMCNC: 32.9 G/DL (ref 31–37)
MCV RBC: 90 FL (ref 80–100)
MONOCYTES NFR BLD: 3 % (ref 2–11)
NEUTROPHILS NFR BLD AUTO: 88 % (ref 40–80)
OSMOLALITY SERPL CALC.SUM OF ELEC: 276 MOSM/KG (ref 275–300)
PLATELET # BLD EST: (no result) 10*3/UL
PLATELET # BLD: 551 10X3/UL (ref 130–400)
PMV BLD AUTO: 8.9 FL (ref 7.4–10.4)
POTASSIUM SERPL-SCNC: 4.6 MMOL/L (ref 3.5–5.1)
RBC # BLD AUTO: 3.89 10X6/UL (ref 4–5.4)
SODIUM SERPL-SCNC: 138 MMOL/L (ref 136–145)
WBC # BLD AUTO: 15.8 10X3/UL (ref 4.8–10.8)

## 2019-10-01 NOTE — NUR
PT DISCHARGED HOME VIA WHEELCHAIR WITH FAMILY. PIV REMOVED WITH CATHETER TIP
FULLY INTACT. PT SIGNED PROPER DISCHARGE INSTRUCTIONS AND REMOVED ALL
VALUABLES FROM THE ROOM.

## 2019-10-01 NOTE — NUR
REPORT RECEIVED. WILL CONTINUE WITH POC. PT CURRENTLY LYING SEMI FOWLERS. CALL
LIGHT W/I REACH. FAMILY AT BEDSIDE. PT CURRENTLY UNDERGOING RESP TRX. RR EVEN
AND UNLABORED ON RA. R.HAND PIV IS SALINE LOCKED. PT DENIES ANY NEEDS AT THIS
TIME. WILL CTM. NO S/S OF DISTRESS NOTED.

## 2019-10-02 NOTE — MORECARE
CASE MANAGEMENT DISCHARGE SUMMARY
 
 
PATIENT: BONITA LUCERO                    UNIT: [id number]
ACCOUNT#: [id number]                       ADM DATE: 19
AGE: 19     : [birth year]  SEX: F            ROOM/BED: [address]    
AUTHOR: ENEDINA,DOC                             PHYSICIAN:                               
 
REFERRING PHYSICIAN: DANNIE SEALS MD           
DATE OF SERVICE: 10/02/19
Discharge Plan
 
 
Patient Name: BONITA LUCERO
Facility: [facility]:[place]
Encounter #: [id number]
Medical Record #: [id number]
: 2000
Planned Disposition: Home
Anticipated Discharge Date: 10/1/19
 
Discharge Date: 10/01/2019
Expected LOS: 9
Initial Reviewer: [id number]
Initial Review Date: 2019
Generated: 10/2/19   8:12 am 
DCP- Discharge Planning
 
Updated by [id number]: Yi Villarreal on 19   2:39 pm CT
Patient Name: BONITA LUCERO                                     
Admission Status: ER   
Accout number: [id number]                              
Admission Date: 2019   
: 2000                                                        
Admission Diagnosis:SHORTNESS OF BREATH   
Attending: ANEL SEALS                                                
Current LOS:  3   
  
Anticipated DC Date:    
Planned Disposition:    
Primary Insurance: [employer] [place] PRIVATE OPTIONS IRAM   
  
  
Discharge Planning Comments: CM MET WITH PATIENT ABOUT DC PLANNING/NEEDS. 
STATES MAY NEED O2 AND NEBS AT DISCHARGE. [facility] SIGNED FOR MODERN CARE IN 
[facility]. CM WILL FOLLOW AND ASSIST.   
  
  
  
  
 
  
  
: Yi Villarreal
 DCPIA - Discharge Planning Initial Assessment
 
Updated by [id number]: Yi Villarreal on 19   3:38 pm
*  Is the patient Alert and Oriented?
Yes
*  PCP
[facility]
*  Pharmacy
[facility] IN [place]
*  Preadmission Environment
Home with Family
*  ADLs
Independent
*  Other Equipment
NONE
*  Community resources currently utilized
None
*  Can the patient safely return to the preadmission environment?
Yes
*  Has this patient been hospitalized within the prior 30 days at any 
hospital?
No
 
 
 
 
 
Coverage Notice
 
Reviewer: [id number] - Yi Villarreal
 
Notice Issued Date-Time: 2019  15:39
Notice Type: Patient Choice Letter
 
Notice Delivered To: Patient
Relationship to Patient: 
Representative Name: 
 
Delivery Method: HAND - Hand Delivered
Isabell Days:
Prior Verbal Notification: 
 
Recipient Understood Notice: Yes
Recipient Signature: Yes
Med Rec Note Co-signed by Attending:
 
Coverage Notice Comment:  
 
Last DP export: 19   2:40 p
 
Patient Name: BONITA LUCERO
Encounter #: [id number]
Page [id number]
 
 
 
 
 
Electronically Signed by HARPAL MCCONNELL on 10/02/19 at 0712
 
 
 
 
 
 
**All edits/amendments must be made on the electronic document**
 
DICTATION DATE: 10/02/19 0712     : MEGAN  10/02/19 0712     
RPT#: [id number]                                DC DATE:10/01/19
                                               STATUS: DIS IN  
[facility]
[address] [place] [address]
***END OF REPORT***

## 2019-10-07 LAB — VIRUS SPEC CULT: (no result)

## 2019-10-09 ENCOUNTER — HOSPITAL ENCOUNTER (OUTPATIENT)
Dept: HOSPITAL 84 - D.RAD | Age: 19
Discharge: HOME | End: 2019-10-09
Attending: INTERNAL MEDICINE
Payer: MEDICAID

## 2019-10-09 VITALS — BODY MASS INDEX: 24.4 KG/M2

## 2019-10-09 DIAGNOSIS — J18.9: Primary | ICD-10-CM

## 2019-10-09 LAB
ALBUMIN SERPL-MCNC: 3.3 G/DL (ref 3.4–5)
ALP SERPL-CCNC: 87 U/L (ref 46–116)
ALT SERPL-CCNC: 84 U/L (ref 10–68)
ANION GAP SERPL CALC-SCNC: 10.5 MMOL/L (ref 8–16)
BASOPHILS NFR BLD AUTO: 0.2 % (ref 0–2)
BILIRUB SERPL-MCNC: 0.47 MG/DL (ref 0.2–1.3)
BUN SERPL-MCNC: 15 MG/DL (ref 7–18)
CALCIUM SERPL-MCNC: 8.7 MG/DL (ref 8.5–10.1)
CHLORIDE SERPL-SCNC: 104 MMOL/L (ref 98–107)
CO2 SERPL-SCNC: 31.4 MMOL/L (ref 21–32)
CREAT SERPL-MCNC: 0.8 MG/DL (ref 0.6–1.3)
EOSINOPHIL NFR BLD: 0.9 % (ref 0–7)
ERYTHROCYTE [DISTWIDTH] IN BLOOD BY AUTOMATED COUNT: 13.8 % (ref 11.5–14.5)
GLOBULIN SER-MCNC: 3.5 G/L
GLUCOSE SERPL-MCNC: 84 MG/DL (ref 74–106)
HCT VFR BLD CALC: 37.5 % (ref 36–48)
HGB BLD-MCNC: 12.2 G/DL (ref 12–16)
IMM GRANULOCYTES NFR BLD: 0.4 % (ref 0–5)
LYMPHOCYTES NFR BLD AUTO: 31.4 % (ref 15–50)
MCH RBC QN AUTO: 29.8 PG (ref 26–34)
MCHC RBC AUTO-ENTMCNC: 32.5 G/DL (ref 31–37)
MCV RBC: 91.7 FL (ref 80–100)
MONOCYTES NFR BLD: 6.2 % (ref 2–11)
NEUTROPHILS NFR BLD AUTO: 60.9 % (ref 40–80)
OSMOLALITY SERPL CALC.SUM OF ELEC: 282 MOSM/KG (ref 275–300)
PLATELET # BLD: 429 10X3/UL (ref 130–400)
PMV BLD AUTO: 8.7 FL (ref 7.4–10.4)
POTASSIUM SERPL-SCNC: 3.9 MMOL/L (ref 3.5–5.1)
PROT SERPL-MCNC: 6.8 G/DL (ref 6.4–8.2)
RBC # BLD AUTO: 4.09 10X6/UL (ref 4–5.4)
SODIUM SERPL-SCNC: 142 MMOL/L (ref 136–145)
WBC # BLD AUTO: 13.6 10X3/UL (ref 4.8–10.8)

## 2020-02-25 ENCOUNTER — HOSPITAL ENCOUNTER (OUTPATIENT)
Dept: HOSPITAL 84 - D.RT | Age: 20
Discharge: HOME | End: 2020-02-25
Attending: INTERNAL MEDICINE
Payer: COMMERCIAL

## 2020-02-25 VITALS — BODY MASS INDEX: 24.4 KG/M2

## 2020-02-25 DIAGNOSIS — R06.09: Primary | ICD-10-CM
